# Patient Record
Sex: FEMALE | Race: WHITE | NOT HISPANIC OR LATINO | Employment: OTHER
[De-identification: names, ages, dates, MRNs, and addresses within clinical notes are randomized per-mention and may not be internally consistent; named-entity substitution may affect disease eponyms.]

---

## 2017-05-17 ENCOUNTER — HOSPITAL (OUTPATIENT)
Dept: OTHER | Age: 47
End: 2017-05-17
Attending: EMERGENCY MEDICINE

## 2017-05-22 PROBLEM — M25.462 EFFUSION OF LEFT KNEE: Status: ACTIVE | Noted: 2017-05-22

## 2017-06-07 PROBLEM — S83.512D SPRAIN OF ANTERIOR CRUCIATE LIGAMENT OF LEFT KNEE, SUBSEQUENT ENCOUNTER: Status: ACTIVE | Noted: 2017-06-07

## 2017-06-12 PROBLEM — Z98.890 S/P ACL RECONSTRUCTION: Status: ACTIVE | Noted: 2017-06-12

## 2017-06-12 PROBLEM — M25.562 ACUTE PAIN OF LEFT KNEE: Status: ACTIVE | Noted: 2017-06-12

## 2017-09-29 ENCOUNTER — OFFICE VISIT (OUTPATIENT)
Dept: INTERNAL MEDICINE CLINIC | Facility: CLINIC | Age: 47
End: 2017-09-29

## 2017-09-29 VITALS
HEIGHT: 67 IN | BODY MASS INDEX: 23.7 KG/M2 | WEIGHT: 151 LBS | TEMPERATURE: 98 F | SYSTOLIC BLOOD PRESSURE: 120 MMHG | HEART RATE: 77 BPM | OXYGEN SATURATION: 98 % | DIASTOLIC BLOOD PRESSURE: 76 MMHG

## 2017-09-29 DIAGNOSIS — J06.9 URI, ACUTE: Primary | ICD-10-CM

## 2017-09-29 PROCEDURE — 99212 OFFICE O/P EST SF 10 MIN: CPT | Performed by: INTERNAL MEDICINE

## 2017-09-29 PROCEDURE — 99213 OFFICE O/P EST LOW 20 MIN: CPT | Performed by: INTERNAL MEDICINE

## 2017-09-29 RX ORDER — AMOXICILLIN AND CLAVULANATE POTASSIUM 875; 125 MG/1; MG/1
1 TABLET, FILM COATED ORAL 2 TIMES DAILY
Qty: 20 TABLET | Refills: 0 | Status: SHIPPED | OUTPATIENT
Start: 2017-09-29 | End: 2017-10-09

## 2017-09-29 NOTE — PROGRESS NOTES
Jhonny Keen is a 40year old female. HPI:   Patient presents with:  Checkup: Cough, colds and sore throat last week.  Went away, sore throat back since yesterday; ear ache      39 y/o F with 1 week h/o +cough; +sputum; +pharyngitis; +sinus and nasal con for abdominal pain, constipation, decreased appetite, diarrhea and vomiting; no melena or hematochezia  All other review of systems are negative.         PHYSICAL EXAM:   Blood pressure 120/76, pulse 77, temperature 98.4 °F (36.9 °C), temperature source [facility]

## 2019-07-18 ENCOUNTER — IMAGING SERVICES (OUTPATIENT)
Dept: OTHER | Age: 49
End: 2019-07-18
Attending: OBSTETRICS & GYNECOLOGY

## 2019-07-30 ENCOUNTER — IMAGING SERVICES (OUTPATIENT)
Dept: OTHER | Age: 49
End: 2019-07-30
Attending: OBSTETRICS & GYNECOLOGY

## 2020-08-11 ENCOUNTER — IMAGING SERVICES (OUTPATIENT)
Dept: OTHER | Age: 50
End: 2020-08-11
Attending: OBSTETRICS & GYNECOLOGY

## 2020-10-24 ENCOUNTER — HOSPITAL ENCOUNTER (OUTPATIENT)
Age: 50
Discharge: HOME | End: 2020-10-24
Attending: EMERGENCY MEDICINE
Payer: COMMERCIAL

## 2020-10-24 VITALS
TEMPERATURE: 98 F | SYSTOLIC BLOOD PRESSURE: 148 MMHG | HEART RATE: 79 BPM | RESPIRATION RATE: 18 BRPM | DIASTOLIC BLOOD PRESSURE: 86 MMHG | OXYGEN SATURATION: 98 %

## 2020-10-24 DIAGNOSIS — Z20.822 ENCOUNTER FOR SCREENING LABORATORY TESTING FOR COVID-19 VIRUS IN ASYMPTOMATIC PATIENT: Primary | ICD-10-CM

## 2020-10-24 PROCEDURE — 99203 OFFICE O/P NEW LOW 30 MIN: CPT

## 2020-10-24 NOTE — ED PROVIDER NOTES
Patient Seen in: [facility] [place]      History   Patient presents with:  Testing    Stated Complaint: covid test    HPI    The patient is a 45-year-old female with no significant past medical history presents now for Covid testing.   The patient sta distress  Head: Normocephalic, no swelling or tenderness  Eyes: Nonicteric sclera, no conjunctival injection  ENT: TMs are clear and flat bilaterally.   There is no posterior pharyngeal erythema  Chest: Clear to auscultation, no tenderness  Cardiovascular:

## 2020-11-01 ENCOUNTER — HOSPITAL ENCOUNTER (OUTPATIENT)
Age: 50
Discharge: HOME | End: 2020-11-01
Payer: COMMERCIAL

## 2020-11-01 VITALS
DIASTOLIC BLOOD PRESSURE: 78 MMHG | TEMPERATURE: 98 F | WEIGHT: 156 LBS | SYSTOLIC BLOOD PRESSURE: 135 MMHG | BODY MASS INDEX: 24.48 KG/M2 | HEIGHT: 67 IN | RESPIRATION RATE: 20 BRPM | HEART RATE: 77 BPM | OXYGEN SATURATION: 98 %

## 2020-11-01 DIAGNOSIS — R50.81 FEVER IN OTHER DISEASES: ICD-10-CM

## 2020-11-01 DIAGNOSIS — Z20.822 EXPOSURE TO COVID-19 VIRUS: Primary | ICD-10-CM

## 2020-11-01 DIAGNOSIS — R09.81 NASAL CONGESTION: ICD-10-CM

## 2020-11-01 PROCEDURE — 99213 OFFICE O/P EST LOW 20 MIN: CPT

## 2020-11-01 NOTE — ED PROVIDER NOTES
Patient Seen in: [facility] [place]      History   Patient presents with:  Sinus Problem    Stated Complaint: Covid 19 test    HPI    Patient presents to the immediate care requesting COVID-19 testing.   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 HENT: Positive for rhinorrhea. Respiratory: Negative. Cardiovascular: Negative. Gastrointestinal: Negative. Skin: Negative. Neurological: Negative.         Pos time.      Deep Tendon Reflexes: Reflexes are normal and symmetric. Psychiatric:         Behavior: Behavior normal.         Thought Content:  Thought content normal.         Judgment: Judgment normal.               ED Course     Labs Reviewed   SARS-COV-2

## 2021-02-25 ENCOUNTER — OFFICE VISIT (OUTPATIENT)
Dept: INTERNAL MEDICINE CLINIC | Facility: CLINIC | Age: 51
End: 2021-02-25
Payer: COMMERCIAL

## 2021-02-25 VITALS
BODY MASS INDEX: 25.11 KG/M2 | HEIGHT: 67 IN | OXYGEN SATURATION: 99 % | SYSTOLIC BLOOD PRESSURE: 136 MMHG | TEMPERATURE: 99 F | HEART RATE: 99 BPM | WEIGHT: 160 LBS | DIASTOLIC BLOOD PRESSURE: 76 MMHG

## 2021-02-25 DIAGNOSIS — N30.00 ACUTE CYSTITIS WITHOUT HEMATURIA: Primary | ICD-10-CM

## 2021-02-25 DIAGNOSIS — G47.00 INSOMNIA, UNSPECIFIED TYPE: ICD-10-CM

## 2021-02-25 DIAGNOSIS — Z00.00 HEALTHCARE MAINTENANCE: ICD-10-CM

## 2021-02-25 LAB
GLUCOSE (URINE DIPSTICK): NEGATIVE MG/DL
KETONES (URINE DIPSTICK): NEGATIVE MG/DL
MULTISTIX LOT#: NORMAL NUMERIC
NITRITE, URINE: NEGATIVE
PH, URINE: 5 (ref 4.5–8)
SPECIFIC GRAVITY: 1.02 (ref 1–1.03)
URINE-COLOR: YELLOW
UROBILINOGEN,SEMI-QN: 0.2 MG/DL (ref 0–1.9)

## 2021-02-25 PROCEDURE — 3075F SYST BP GE 130 - 139MM HG: CPT | Performed by: INTERNAL MEDICINE

## 2021-02-25 PROCEDURE — 99214 OFFICE O/P EST MOD 30 MIN: CPT | Performed by: INTERNAL MEDICINE

## 2021-02-25 PROCEDURE — 3078F DIAST BP <80 MM HG: CPT | Performed by: INTERNAL MEDICINE

## 2021-02-25 PROCEDURE — 81002 URINALYSIS NONAUTO W/O SCOPE: CPT | Performed by: INTERNAL MEDICINE

## 2021-02-25 PROCEDURE — 3008F BODY MASS INDEX DOCD: CPT | Performed by: INTERNAL MEDICINE

## 2021-02-25 RX ORDER — TRAZODONE HYDROCHLORIDE 50 MG/1
50 TABLET ORAL NIGHTLY
Qty: 30 TABLET | Refills: 5 | Status: SHIPPED | OUTPATIENT
Start: 2021-02-25

## 2021-02-25 RX ORDER — SULFAMETHOXAZOLE AND TRIMETHOPRIM 800; 160 MG/1; MG/1
1 TABLET ORAL 2 TIMES DAILY
Qty: 14 TABLET | Refills: 0 | Status: SHIPPED | OUTPATIENT
Start: 2021-02-25

## 2021-02-25 NOTE — PROGRESS NOTES
Tian Tian is a 53year old female.     HPI:   Patient presents with:  UTI: Patient here due to frequent urination x 1 week  Sleep Problem: pt also c/o not being able to sleep      52 y/o F with 1 week h/o +urine frequency; no dysuria; no abdominal pain; (VESTURA) 3-0.02 MG Oral Tab Take 1 tablet by mouth daily.          Allergies:  No Known Allergies              ROS:   Constitutional: no weight loss; no fatigue  Cardiovascular:  Negative for chest pain; negative palpitations  Respiratory:  Negative for co tablet (50 mg total) by mouth nightly.        Imaging & Referrals:  None     2/25/2021  Chaim Cancino MD

## 2021-03-01 ENCOUNTER — TELEPHONE (OUTPATIENT)
Dept: INTERNAL MEDICINE CLINIC | Facility: CLINIC | Age: 51
End: 2021-03-01

## 2021-03-01 RX ORDER — CIPROFLOXACIN 500 MG/1
500 TABLET, FILM COATED ORAL 2 TIMES DAILY
Qty: 14 TABLET | Refills: 0 | Status: SHIPPED | OUTPATIENT
Start: 2021-03-01

## 2021-03-02 NOTE — TELEPHONE ENCOUNTER
Imp- UTI; E Coli > 100K resistant to sulfa    Rec- stop Bactrim    Start Cipro 500 mg po BID #14;    Pt called

## 2021-03-18 ENCOUNTER — IMMUNIZATION (OUTPATIENT)
Dept: LAB | Facility: HOSPITAL | Age: 51
End: 2021-03-18
Attending: HOSPITALIST
Payer: COMMERCIAL

## 2021-03-18 DIAGNOSIS — Z23 NEED FOR VACCINATION: Primary | ICD-10-CM

## 2021-03-18 PROCEDURE — 0011A SARSCOV2 VAC 100MCG/0.5ML IM: CPT

## 2021-04-15 ENCOUNTER — IMMUNIZATION (OUTPATIENT)
Dept: LAB | Facility: HOSPITAL | Age: 51
End: 2021-04-15
Attending: EMERGENCY MEDICINE
Payer: COMMERCIAL

## 2021-04-15 DIAGNOSIS — Z23 NEED FOR VACCINATION: Primary | ICD-10-CM

## 2021-04-15 PROCEDURE — 0012A SARSCOV2 VAC 100MCG/0.5ML IM: CPT

## 2021-07-29 ENCOUNTER — HOSPITAL ENCOUNTER (OUTPATIENT)
Dept: ULTRASOUND IMAGING | Age: 51
Discharge: HOME | End: 2021-07-29
Attending: OBSTETRICS & GYNECOLOGY

## 2021-07-29 ENCOUNTER — HOSPITAL ENCOUNTER (OUTPATIENT)
Dept: MAMMOGRAPHY | Age: 51
Discharge: HOME | End: 2021-07-29
Attending: OBSTETRICS & GYNECOLOGY

## 2021-07-29 DIAGNOSIS — R92.2 DENSE BREAST: ICD-10-CM

## 2021-07-29 DIAGNOSIS — R92.30 DENSE BREAST: ICD-10-CM

## 2021-07-29 DIAGNOSIS — Z12.31 ENCOUNTER FOR SCREENING MAMMOGRAM FOR MALIGNANT NEOPLASM OF BREAST: ICD-10-CM

## 2021-07-29 PROCEDURE — 77067 SCR MAMMO BI INCL CAD: CPT

## 2021-07-29 PROCEDURE — 76641 ULTRASOUND BREAST COMPLETE: CPT

## 2022-06-20 ENCOUNTER — PATIENT MESSAGE (OUTPATIENT)
Dept: INTERNAL MEDICINE CLINIC | Facility: CLINIC | Age: 52
End: 2022-06-20

## 2022-06-20 NOTE — TELEPHONE ENCOUNTER
From: Mika Perez  Sent: 6/20/2022 2:55 PM CDT  To: [facility] Clinical Staff  Subject: Annual Physical    Ok thank you !  Going on vacation til June 28   Yoli

## 2022-07-29 ENCOUNTER — TELEPHONE (OUTPATIENT)
Dept: INTERNAL MEDICINE CLINIC | Facility: CLINIC | Age: 52
End: 2022-07-29

## 2022-07-29 DIAGNOSIS — E55.9 VITAMIN D DEFICIENCY: ICD-10-CM

## 2022-07-29 DIAGNOSIS — Z00.00 ANNUAL PHYSICAL EXAM: Primary | ICD-10-CM

## 2022-07-29 NOTE — TELEPHONE ENCOUNTER
Patient called to schedule an annual physical with Dr Caitlin Treadwell. Patient scheduled on 8/11/2022 at 1515. Patient is requesting blood work to be added to the system to complete ahead of time (patient is going to go to the [address] [place] [address] location). Please send patient a [employer] message when orders are placed.

## 2022-08-11 ENCOUNTER — LAB ENCOUNTER (OUTPATIENT)
Dept: LAB | Age: 52
End: 2022-08-11
Attending: INTERNAL MEDICINE
Payer: COMMERCIAL

## 2022-08-11 ENCOUNTER — OFFICE VISIT (OUTPATIENT)
Dept: INTERNAL MEDICINE CLINIC | Facility: CLINIC | Age: 52
End: 2022-08-11
Payer: COMMERCIAL

## 2022-08-11 VITALS
HEIGHT: 67 IN | HEART RATE: 79 BPM | WEIGHT: 165.13 LBS | BODY MASS INDEX: 25.92 KG/M2 | RESPIRATION RATE: 15 BRPM | SYSTOLIC BLOOD PRESSURE: 126 MMHG | DIASTOLIC BLOOD PRESSURE: 78 MMHG

## 2022-08-11 DIAGNOSIS — M17.11 PRIMARY OSTEOARTHRITIS OF RIGHT KNEE: ICD-10-CM

## 2022-08-11 DIAGNOSIS — E55.9 VITAMIN D DEFICIENCY: ICD-10-CM

## 2022-08-11 DIAGNOSIS — Z00.00 ANNUAL PHYSICAL EXAM: Primary | ICD-10-CM

## 2022-08-11 DIAGNOSIS — J30.9 ALLERGIC RHINITIS, UNSPECIFIED SEASONALITY, UNSPECIFIED TRIGGER: ICD-10-CM

## 2022-08-11 DIAGNOSIS — Z00.00 ANNUAL PHYSICAL EXAM: ICD-10-CM

## 2022-08-11 LAB
ALBUMIN SERPL-MCNC: 3.9 G/DL (ref 3.4–5)
ALBUMIN/GLOB SERPL: 1 {RATIO} (ref 1–2)
ALP LIVER SERPL-CCNC: 89 U/L
ALT SERPL-CCNC: 32 U/L
ANION GAP SERPL CALC-SCNC: 6 MMOL/L (ref 0–18)
AST SERPL-CCNC: 18 U/L (ref 15–37)
BASOPHILS # BLD AUTO: 0.07 X10(3) UL (ref 0–0.2)
BASOPHILS NFR BLD AUTO: 0.9 %
BILIRUB SERPL-MCNC: 0.8 MG/DL (ref 0.1–2)
BUN BLD-MCNC: 16 MG/DL (ref 7–18)
BUN/CREAT SERPL: 16.7 (ref 10–20)
CALCIUM BLD-MCNC: 9.4 MG/DL (ref 8.5–10.1)
CHLORIDE SERPL-SCNC: 107 MMOL/L (ref 98–112)
CHOLEST SERPL-MCNC: 136 MG/DL (ref ?–200)
CO2 SERPL-SCNC: 29 MMOL/L (ref 21–32)
CREAT BLD-MCNC: 0.96 MG/DL
DEPRECATED RDW RBC AUTO: 41.1 FL (ref 35.1–46.3)
EOSINOPHIL # BLD AUTO: 0.21 X10(3) UL (ref 0–0.7)
EOSINOPHIL NFR BLD AUTO: 2.6 %
ERYTHROCYTE [DISTWIDTH] IN BLOOD BY AUTOMATED COUNT: 12.5 % (ref 11–15)
FASTING PATIENT LIPID ANSWER: NO
FASTING STATUS PATIENT QL REPORTED: NO
GFR SERPLBLD BASED ON 1.73 SQ M-ARVRAT: 72 ML/MIN/1.73M2 (ref 60–?)
GLOBULIN PLAS-MCNC: 3.8 G/DL (ref 2.8–4.4)
GLUCOSE BLD-MCNC: 78 MG/DL (ref 70–99)
HCT VFR BLD AUTO: 42.6 %
HDLC SERPL-MCNC: 69 MG/DL (ref 40–59)
HGB BLD-MCNC: 13.4 G/DL
IMM GRANULOCYTES # BLD AUTO: 0.02 X10(3) UL (ref 0–1)
IMM GRANULOCYTES NFR BLD: 0.2 %
LDLC SERPL CALC-MCNC: 56 MG/DL (ref ?–100)
LYMPHOCYTES # BLD AUTO: 2.66 X10(3) UL (ref 1–4)
LYMPHOCYTES NFR BLD AUTO: 32.4 %
MCH RBC QN AUTO: 28.2 PG (ref 26–34)
MCHC RBC AUTO-ENTMCNC: 31.5 G/DL (ref 31–37)
MCV RBC AUTO: 89.5 FL
MONOCYTES # BLD AUTO: 0.91 X10(3) UL (ref 0.1–1)
MONOCYTES NFR BLD AUTO: 11.1 %
NEUTROPHILS # BLD AUTO: 4.34 X10 (3) UL (ref 1.5–7.7)
NEUTROPHILS # BLD AUTO: 4.34 X10(3) UL (ref 1.5–7.7)
NEUTROPHILS NFR BLD AUTO: 52.8 %
NONHDLC SERPL-MCNC: 67 MG/DL (ref ?–130)
OSMOLALITY SERPL CALC.SUM OF ELEC: 294 MOSM/KG (ref 275–295)
PLATELET # BLD AUTO: 357 10(3)UL (ref 150–450)
POTASSIUM SERPL-SCNC: 4.3 MMOL/L (ref 3.5–5.1)
PROT SERPL-MCNC: 7.7 G/DL (ref 6.4–8.2)
RBC # BLD AUTO: 4.76 X10(6)UL
SODIUM SERPL-SCNC: 142 MMOL/L (ref 136–145)
TRIGL SERPL-MCNC: 51 MG/DL (ref 30–149)
TSI SER-ACNC: 1.23 MIU/ML (ref 0.36–3.74)
VIT D+METAB SERPL-MCNC: 47.1 NG/ML (ref 30–100)
VLDLC SERPL CALC-MCNC: 7 MG/DL (ref 0–30)
WBC # BLD AUTO: 8.2 X10(3) UL (ref 4–11)

## 2022-08-11 PROCEDURE — 85025 COMPLETE CBC W/AUTO DIFF WBC: CPT

## 2022-08-11 PROCEDURE — 84443 ASSAY THYROID STIM HORMONE: CPT

## 2022-08-11 PROCEDURE — 80053 COMPREHEN METABOLIC PANEL: CPT

## 2022-08-11 PROCEDURE — 82306 VITAMIN D 25 HYDROXY: CPT

## 2022-08-11 PROCEDURE — 36415 COLL VENOUS BLD VENIPUNCTURE: CPT

## 2022-08-11 PROCEDURE — 99396 PREV VISIT EST AGE 40-64: CPT | Performed by: INTERNAL MEDICINE

## 2022-08-11 PROCEDURE — 3078F DIAST BP <80 MM HG: CPT | Performed by: INTERNAL MEDICINE

## 2022-08-11 PROCEDURE — 3008F BODY MASS INDEX DOCD: CPT | Performed by: INTERNAL MEDICINE

## 2022-08-11 PROCEDURE — 3074F SYST BP LT 130 MM HG: CPT | Performed by: INTERNAL MEDICINE

## 2022-08-11 PROCEDURE — 80061 LIPID PANEL: CPT

## 2022-09-23 ENCOUNTER — HOSPITAL ENCOUNTER (OUTPATIENT)
Age: 52
Discharge: HOME | End: 2022-09-23
Attending: EMERGENCY MEDICINE

## 2022-09-23 ENCOUNTER — APPOINTMENT (OUTPATIENT)
Dept: GENERAL RADIOLOGY | Age: 52
End: 2022-09-23
Attending: EMERGENCY MEDICINE

## 2022-09-23 VITALS
SYSTOLIC BLOOD PRESSURE: 125 MMHG | TEMPERATURE: 98 F | RESPIRATION RATE: 18 BRPM | HEART RATE: 66 BPM | OXYGEN SATURATION: 99 % | DIASTOLIC BLOOD PRESSURE: 75 MMHG

## 2022-09-23 DIAGNOSIS — S60.222A CONTUSION OF LEFT HAND, INITIAL ENCOUNTER: Primary | ICD-10-CM

## 2022-09-23 PROCEDURE — 73130 X-RAY EXAM OF HAND: CPT | Performed by: EMERGENCY MEDICINE

## 2022-09-23 PROCEDURE — 99213 OFFICE O/P EST LOW 20 MIN: CPT
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
## 2022-10-18 ENCOUNTER — HOSPITAL ENCOUNTER (OUTPATIENT)
Dept: MAMMOGRAPHY | Age: 52
Discharge: HOME | End: 2022-10-18
Attending: OBSTETRICS & GYNECOLOGY

## 2022-10-18 DIAGNOSIS — Z12.31 VISIT FOR SCREENING MAMMOGRAM: ICD-10-CM

## 2022-10-18 PROCEDURE — 77063 BREAST TOMOSYNTHESIS BI: CPT

## 2022-12-27 ENCOUNTER — E-VISIT (OUTPATIENT)
Dept: TELEHEALTH | Age: 52
End: 2022-12-27
Payer: COMMERCIAL

## 2022-12-27 DIAGNOSIS — R39.9 UTI SYMPTOMS: Primary | ICD-10-CM

## 2022-12-27 PROCEDURE — 99421 OL DIG E/M SVC 5-10 MIN: CPT | Performed by: NURSE PRACTITIONER

## 2022-12-28 RX ORDER — CETIRIZINE HYDROCHLORIDE 10 MG/1
10 TABLET ORAL DAILY
Refills: 0 | OUTPATIENT
Start: 2022-12-28

## 2022-12-28 RX ORDER — NITROFURANTOIN 25; 75 MG/1; MG/1
100 CAPSULE ORAL 2 TIMES DAILY
Qty: 10 CAPSULE | Refills: 0 | Status: SHIPPED | OUTPATIENT
Start: 2022-12-28 | End: 2023-01-02

## 2024-04-01 ENCOUNTER — OFFICE VISIT (OUTPATIENT)
Dept: INTERNAL MEDICINE CLINIC | Facility: CLINIC | Age: 54
End: 2024-04-01

## 2024-04-01 ENCOUNTER — LAB ENCOUNTER (OUTPATIENT)
Dept: LAB | Age: 54
End: 2024-04-01
Attending: INTERNAL MEDICINE
Payer: COMMERCIAL

## 2024-04-01 VITALS — BODY MASS INDEX: 25.9 KG/M2 | WEIGHT: 165 LBS | HEIGHT: 67 IN

## 2024-04-01 DIAGNOSIS — Z00.00 HEALTHCARE MAINTENANCE: ICD-10-CM

## 2024-04-01 DIAGNOSIS — E66.3 OVERWEIGHT (BMI 25.0-29.9): ICD-10-CM

## 2024-04-01 DIAGNOSIS — R19.7 DIARRHEA OF PRESUMED INFECTIOUS ORIGIN: Primary | ICD-10-CM

## 2024-04-01 LAB
ALBUMIN SERPL-MCNC: 4.5 G/DL (ref 3.2–4.8)
ALBUMIN/GLOB SERPL: 1.5 {RATIO} (ref 1–2)
ALP LIVER SERPL-CCNC: 100 U/L
ALT SERPL-CCNC: 21 U/L
ANION GAP SERPL CALC-SCNC: 3 MMOL/L (ref 0–18)
AST SERPL-CCNC: 19 U/L (ref ?–34)
BASOPHILS # BLD AUTO: 0.1 X10(3) UL (ref 0–0.2)
BASOPHILS NFR BLD AUTO: 1.1 %
BILIRUB SERPL-MCNC: 0.4 MG/DL (ref 0.3–1.2)
BUN BLD-MCNC: 13 MG/DL (ref 9–23)
BUN/CREAT SERPL: 16.9 (ref 10–20)
CALCIUM BLD-MCNC: 9.5 MG/DL (ref 8.7–10.4)
CHLORIDE SERPL-SCNC: 111 MMOL/L (ref 98–112)
CHOLEST SERPL-MCNC: 115 MG/DL (ref ?–200)
CO2 SERPL-SCNC: 30 MMOL/L (ref 21–32)
CREAT BLD-MCNC: 0.77 MG/DL
DEPRECATED RDW RBC AUTO: 37.3 FL (ref 35.1–46.3)
EGFRCR SERPLBLD CKD-EPI 2021: 93 ML/MIN/1.73M2 (ref 60–?)
EOSINOPHIL # BLD AUTO: 0.18 X10(3) UL (ref 0–0.7)
EOSINOPHIL NFR BLD AUTO: 1.9 %
ERYTHROCYTE [DISTWIDTH] IN BLOOD BY AUTOMATED COUNT: 12.3 % (ref 11–15)
FASTING PATIENT LIPID ANSWER: YES
FASTING STATUS PATIENT QL REPORTED: YES
GLOBULIN PLAS-MCNC: 3.1 G/DL (ref 2.8–4.4)
GLUCOSE BLD-MCNC: 82 MG/DL (ref 70–99)
HCT VFR BLD AUTO: 41.7 %
HDLC SERPL-MCNC: 40 MG/DL (ref 40–59)
HGB BLD-MCNC: 14.5 G/DL
IMM GRANULOCYTES # BLD AUTO: 0.02 X10(3) UL (ref 0–1)
IMM GRANULOCYTES NFR BLD: 0.2 %
LDLC SERPL CALC-MCNC: 57 MG/DL (ref ?–100)
LYMPHOCYTES # BLD AUTO: 2.62 X10(3) UL (ref 1–4)
LYMPHOCYTES NFR BLD AUTO: 27.6 %
MCH RBC QN AUTO: 29.2 PG (ref 26–34)
MCHC RBC AUTO-ENTMCNC: 34.8 G/DL (ref 31–37)
MCV RBC AUTO: 83.9 FL
MONOCYTES # BLD AUTO: 0.74 X10(3) UL (ref 0.1–1)
MONOCYTES NFR BLD AUTO: 7.8 %
NEUTROPHILS # BLD AUTO: 5.84 X10 (3) UL (ref 1.5–7.7)
NEUTROPHILS # BLD AUTO: 5.84 X10(3) UL (ref 1.5–7.7)
NEUTROPHILS NFR BLD AUTO: 61.4 %
NONHDLC SERPL-MCNC: 75 MG/DL (ref ?–130)
OSMOLALITY SERPL CALC.SUM OF ELEC: 297 MOSM/KG (ref 275–295)
PLATELET # BLD AUTO: 352 10(3)UL (ref 150–450)
POTASSIUM SERPL-SCNC: 3.9 MMOL/L (ref 3.5–5.1)
PROT SERPL-MCNC: 7.6 G/DL (ref 5.7–8.2)
RBC # BLD AUTO: 4.97 X10(6)UL
SODIUM SERPL-SCNC: 144 MMOL/L (ref 136–145)
TRIGL SERPL-MCNC: 97 MG/DL (ref 30–149)
TSI SER-ACNC: 1 MIU/ML (ref 0.55–4.78)
VLDLC SERPL CALC-MCNC: 14 MG/DL (ref 0–30)
WBC # BLD AUTO: 9.5 X10(3) UL (ref 4–11)

## 2024-04-01 PROCEDURE — 3008F BODY MASS INDEX DOCD: CPT | Performed by: INTERNAL MEDICINE

## 2024-04-01 PROCEDURE — 85025 COMPLETE CBC W/AUTO DIFF WBC: CPT

## 2024-04-01 PROCEDURE — 36415 COLL VENOUS BLD VENIPUNCTURE: CPT

## 2024-04-01 PROCEDURE — 99214 OFFICE O/P EST MOD 30 MIN: CPT | Performed by: INTERNAL MEDICINE

## 2024-04-01 PROCEDURE — 84443 ASSAY THYROID STIM HORMONE: CPT

## 2024-04-01 PROCEDURE — 80061 LIPID PANEL: CPT

## 2024-04-01 PROCEDURE — 80053 COMPREHEN METABOLIC PANEL: CPT

## 2024-04-01 RX ORDER — CIPROFLOXACIN 500 MG/1
500 TABLET, FILM COATED ORAL 2 TIMES DAILY
Qty: 14 TABLET | Refills: 0 | Status: SHIPPED | OUTPATIENT
Start: 2024-04-01

## 2024-04-01 RX ORDER — PHENTERMINE HYDROCHLORIDE 15 MG/1
15 CAPSULE ORAL EVERY MORNING
Qty: 30 CAPSULE | Refills: 3 | Status: SHIPPED | OUTPATIENT
Start: 2024-04-01

## 2024-04-01 NOTE — PROGRESS NOTES
Yoli Callejas is a 53 year old female.    HPI:     Chief Complaint   Patient presents with    Checkup     C/O Abnormal Bowel Habits; Started off with GI upset and vomiting Fri 3/29, the 3-4 bouts of Diarrhea daily. Seeing some improvements since starting probiotic however leaving for trip to [place]       54 y/o F with 10 d h/o diarrhea; had eaten chicken prior to onset of sxs; pt initially though she had food poisoning; has +nausea with emesis x 24 hours; had stool frequency 4-5 times per days x 5d; took probiotic that has helped firm up stool; no F/C; no melena; leaving [place] x 3d; nares COVID test neg 7 d ago; wishes to pursue medical Rx for weight loss        HISTORY:  Past Medical History:   Diagnosis Date    Allergic rhinitis     Depression     Environmental allergies     Pneumothorax 2005    S/P ACL repair     on left knee in June 2017; by Ortho Dr Colon    S/P ACL repair     of right knee in 2010      Past Surgical History:   Procedure Laterality Date    APPENDECTOMY      KNEE SURGERY Left 06/02/2017    ACL, Dr Colon    OTHER SURGICAL HISTORY      Right ACL repair    OTHER SURGICAL HISTORY  06/2017    Left ACL repair      Family History   Problem Relation Age of Onset    Hypertension Father     Heart Disorder Father 50    Diabetes Father         type 2    Lung Disorder Father         Emphysema    Heart Disease Father     Cancer Mother         pancreatic    Breast Cancer Maternal Grandmother     Cancer Brother         testicular      Social History:   Social History     Socioeconomic History    Marital status:    Tobacco Use    Smoking status: Never    Smokeless tobacco: Never   Vaping Use    Vaping Use: Never used   Substance and Sexual Activity    Alcohol use: Yes     Comment: 2 drinks a month    Drug use: No   Other Topics Concern    Caffeine Concern Yes     Comment: Coffee 3 cups daily        Medications (Active prior to today's visit):  Current Outpatient Medications   Medication Sig Dispense  Refill    ciprofloxacin 500 MG Oral Tab Take 1 tablet (500 mg total) by mouth 2 (two) times daily. 14 tablet 0    Phentermine HCl 15 MG Oral Cap Take 1 capsule (15 mg total) by mouth every morning. 30 capsule 3    cetirizine (ZYRTEC) 10 MG Oral Tab Take 1 tablet (10 mg total) by mouth daily. For allergies         Allergies:  No Known Allergies              ROS:   Constitutional: no weight loss; no fatigue  Cardiovascular:  Negative for chest pain; negative palpitations  Respiratory:  Negative for cough, dyspnea and wheezing  Gastrointestinal:  Negative for abdominal pain, constipation, decreased appetite, diarrhea and vomiting; no melena or hematochezia  All other review of systems are negative.        PHYSICAL EXAM:   Height 5' 7\" (1.702 m), weight 165 lb (74.8 kg).  Constitutional: alert and oriented x3 in no acute distress  HEENT- EOMI, PERRL  Nose/Mouth/Throat: pharynx without erythema; no oral lesions  Neck/Thyroid: neck supple; no thyromegaly  Cardiovascular: RRR, S1, S2, no S3 or murmur  Respiratory: lungs without crackles or wheezes  Abdomen: normoactive bowel sounds, soft, non-tender and non-distended  Extremities: no clubbing, cyanosis or edema           ASSESSMENT/PLAN:   Diarrhea  Sxs x 10d; presumed infectious; leaving for [place] in 3 d  -empiric Cipro 500 mg po BID #14    Overweight  Body mass index is 25.84 kg/m².  -trial phentermine 15 mg po every day #30, 3 RF    Health Maintenance  Sees gynecologist Dr Rodríguez; mammo per Dr Rodríguez; had negative Cologuard in June 2022; next due in 3 years or June 2025; check CBC, CMP, TSH, Lipids, Vitamin D     Osteoarhritis of right knee  XR in Feb 2022 shows evidence of previous ACL   reconstruction of the right knee.  There is moderate narrowing of the   medial, lateral, patellofemoral compartment.  -seen by orthopedics, Dr Romero     Allergic rhinitis  On Zyrtec 10 mg po qD          Spent 30 minutes obtaining history, evaluating patient, discussing treatment  options, diet, exercise, review of available labs and radiology reports, and completing documentation.             Orders This Visit:  Orders Placed This Encounter   Procedures    CBC With Differential With Platelet    Comp Metabolic Panel (14)    Lipid Panel    TSH W Reflex To Free T4       Meds This Visit:  Requested Prescriptions     Signed Prescriptions Disp Refills    ciprofloxacin 500 MG Oral Tab 14 tablet 0     Sig: Take 1 tablet (500 mg total) by mouth 2 (two) times daily.    Phentermine HCl 15 MG Oral Cap 30 capsule 3     Sig: Take 1 capsule (15 mg total) by mouth every morning.       Imaging & Referrals:  None     4/1/2024  Fito Hi MD

## 2024-07-23 ENCOUNTER — TELEPHONE (OUTPATIENT)
Dept: INTERNAL MEDICINE CLINIC | Facility: CLINIC | Age: 54
End: 2024-07-23

## 2024-07-23 DIAGNOSIS — E66.3 OVERWEIGHT (BMI 25.0-29.9): ICD-10-CM

## 2024-07-23 NOTE — TELEPHONE ENCOUNTER
Patient requests refill for Phentermine    Patient has lost 14 lbs since taking the medication  She feels better & hasn't experienced any side effects    Ph[facility]acy - [facility] [place]

## 2024-07-24 RX ORDER — PHENTERMINE HYDROCHLORIDE 15 MG/1
15 CAPSULE ORAL EVERY MORNING
Qty: 30 CAPSULE | Refills: 3 | Status: SHIPPED | OUTPATIENT
Start: 2024-07-24

## 2024-07-24 NOTE — TELEPHONE ENCOUNTER
To MD:  The above refill request is for a controlled substance.  Please review pended medication order.   Print and sign for staff to fax to pharmacy or prescribe electronically.    Last office visit:4/1/24  Last time refill sent and quantity/refills:4/1/24 # 30    To

## 2025-03-20 ENCOUNTER — TELEPHONE (OUTPATIENT)
Dept: INTERNAL MEDICINE CLINIC | Facility: CLINIC | Age: 55
End: 2025-03-20

## 2025-03-20 DIAGNOSIS — E66.3 OVERWEIGHT (BMI 25.0-29.9): ICD-10-CM

## 2025-03-20 RX ORDER — PHENTERMINE HYDROCHLORIDE 15 MG/1
15 CAPSULE ORAL EVERY MORNING
Qty: 30 CAPSULE | Refills: 1 | Status: SHIPPED | OUTPATIENT
Start: 2025-03-20

## 2025-03-20 NOTE — TELEPHONE ENCOUNTER
Pt not taking medication regularly? Last filled 11/22/24    To MD:  The above refill request is for a controlled substance.  Please review pended medication order.   Print and sign for staff to fax to pharmacy or prescribe electronically.    Last office visit: 4/1/24  Last time refill sent and quantity/refills: 7/24/24 #30 and 3 refills  [place]22/24 #30

## 2025-03-20 NOTE — TELEPHONE ENCOUNTER
ERx sent for phentermine 15 mg po every day #30, 1 RF    Last office visit: 4/1/24     She is due for annual PEx; please call pt

## 2025-05-21 ENCOUNTER — MED REC SCAN ONLY (OUTPATIENT)
Dept: INTERNAL MEDICINE CLINIC | Facility: CLINIC | Age: 55
End: 2025-05-21

## 2025-05-21 ENCOUNTER — OFFICE VISIT (OUTPATIENT)
Dept: INTERNAL MEDICINE CLINIC | Facility: CLINIC | Age: 55
End: 2025-05-21
Payer: COMMERCIAL

## 2025-05-21 ENCOUNTER — TELEPHONE (OUTPATIENT)
Dept: INTERNAL MEDICINE CLINIC | Facility: CLINIC | Age: 55
End: 2025-05-21

## 2025-05-21 ENCOUNTER — LAB ENCOUNTER (OUTPATIENT)
Dept: LAB | Age: 55
End: 2025-05-21
Attending: INTERNAL MEDICINE
Payer: COMMERCIAL

## 2025-05-21 VITALS
HEART RATE: 72 BPM | TEMPERATURE: 98 F | OXYGEN SATURATION: 98 % | DIASTOLIC BLOOD PRESSURE: 89 MMHG | SYSTOLIC BLOOD PRESSURE: 157 MMHG | HEIGHT: 67 IN | BODY MASS INDEX: 22.6 KG/M2 | WEIGHT: 144 LBS | RESPIRATION RATE: 16 BRPM

## 2025-05-21 DIAGNOSIS — Z00.00 PHYSICAL EXAM, ANNUAL: ICD-10-CM

## 2025-05-21 DIAGNOSIS — J30.9 ALLERGIC RHINITIS, UNSPECIFIED SEASONALITY, UNSPECIFIED TRIGGER: ICD-10-CM

## 2025-05-21 DIAGNOSIS — E66.3 OVERWEIGHT (BMI 25.0-29.9): ICD-10-CM

## 2025-05-21 DIAGNOSIS — M17.11 PRIMARY OSTEOARTHRITIS OF RIGHT KNEE: ICD-10-CM

## 2025-05-21 DIAGNOSIS — Z00.00 PHYSICAL EXAM, ANNUAL: Primary | ICD-10-CM

## 2025-05-21 LAB
ALBUMIN SERPL-MCNC: 4.9 G/DL (ref 3.2–4.8)
ALBUMIN/GLOB SERPL: 1.8 {RATIO} (ref 1–2)
ALP LIVER SERPL-CCNC: 99 U/L (ref 41–108)
ALT SERPL-CCNC: 11 U/L (ref 10–49)
ANION GAP SERPL CALC-SCNC: 11 MMOL/L (ref 0–18)
AST SERPL-CCNC: 15 U/L (ref ?–34)
BASOPHILS # BLD AUTO: 0.09 X10(3) UL (ref 0–0.2)
BASOPHILS NFR BLD AUTO: 0.9 %
BILIRUB SERPL-MCNC: 1.1 MG/DL (ref 0.3–1.2)
BUN BLD-MCNC: 13 MG/DL (ref 9–23)
BUN/CREAT SERPL: 14.3 (ref 10–20)
CALCIUM BLD-MCNC: 9.5 MG/DL (ref 8.7–10.4)
CHLORIDE SERPL-SCNC: 102 MMOL/L (ref 98–112)
CHOLEST SERPL-MCNC: 137 MG/DL (ref ?–200)
CO2 SERPL-SCNC: 26 MMOL/L (ref 21–32)
CREAT BLD-MCNC: 0.91 MG/DL (ref 0.55–1.02)
DEPRECATED RDW RBC AUTO: 38.3 FL (ref 35.1–46.3)
EGFRCR SERPLBLD CKD-EPI 2021: 75 ML/MIN/1.73M2 (ref 60–?)
EOSINOPHIL # BLD AUTO: 0.17 X10(3) UL (ref 0–0.7)
EOSINOPHIL NFR BLD AUTO: 1.7 %
ERYTHROCYTE [DISTWIDTH] IN BLOOD BY AUTOMATED COUNT: 12.4 % (ref 11–15)
FASTING PATIENT LIPID ANSWER: YES
FASTING STATUS PATIENT QL REPORTED: YES
GLOBULIN PLAS-MCNC: 2.7 G/DL (ref 2–3.5)
GLUCOSE BLD-MCNC: 92 MG/DL (ref 70–99)
HCT VFR BLD AUTO: 43.7 % (ref 35–48)
HDLC SERPL-MCNC: 69 MG/DL (ref 40–59)
HGB BLD-MCNC: 14.5 G/DL (ref 12–16)
IMM GRANULOCYTES # BLD AUTO: 0.02 X10(3) UL (ref 0–1)
IMM GRANULOCYTES NFR BLD: 0.2 %
LDLC SERPL CALC-MCNC: 52 MG/DL (ref ?–100)
LYMPHOCYTES # BLD AUTO: 3.53 X10(3) UL (ref 1–4)
LYMPHOCYTES NFR BLD AUTO: 35 %
MCH RBC QN AUTO: 28.3 PG (ref 26–34)
MCHC RBC AUTO-ENTMCNC: 33.2 G/DL (ref 31–37)
MCV RBC AUTO: 85.4 FL (ref 80–100)
MONOCYTES # BLD AUTO: 0.85 X10(3) UL (ref 0.1–1)
MONOCYTES NFR BLD AUTO: 8.4 %
NEUTROPHILS # BLD AUTO: 5.43 X10 (3) UL (ref 1.5–7.7)
NEUTROPHILS # BLD AUTO: 5.43 X10(3) UL (ref 1.5–7.7)
NEUTROPHILS NFR BLD AUTO: 53.8 %
NONHDLC SERPL-MCNC: 68 MG/DL (ref ?–130)
OSMOLALITY SERPL CALC.SUM OF ELEC: 288 MOSM/KG (ref 275–295)
PLATELET # BLD AUTO: 402 10(3)UL (ref 150–450)
POTASSIUM SERPL-SCNC: 4.7 MMOL/L (ref 3.5–5.1)
PROT SERPL-MCNC: 7.6 G/DL (ref 5.7–8.2)
RBC # BLD AUTO: 5.12 X10(6)UL (ref 3.8–5.3)
SODIUM SERPL-SCNC: 139 MMOL/L (ref 136–145)
TRIGL SERPL-MCNC: 84 MG/DL (ref 30–149)
TSI SER-ACNC: 1.16 UIU/ML (ref 0.55–4.78)
VLDLC SERPL CALC-MCNC: 12 MG/DL (ref 0–30)
WBC # BLD AUTO: 10.1 X10(3) UL (ref 4–11)

## 2025-05-21 PROCEDURE — 36415 COLL VENOUS BLD VENIPUNCTURE: CPT

## 2025-05-21 PROCEDURE — 80061 LIPID PANEL: CPT

## 2025-05-21 PROCEDURE — 85025 COMPLETE CBC W/AUTO DIFF WBC: CPT

## 2025-05-21 PROCEDURE — 80053 COMPREHEN METABOLIC PANEL: CPT

## 2025-05-21 PROCEDURE — 84443 ASSAY THYROID STIM HORMONE: CPT

## 2025-05-21 RX ORDER — PHENTERMINE HYDROCHLORIDE 15 MG/1
15 CAPSULE ORAL EVERY MORNING
Qty: 30 CAPSULE | Refills: 5 | Status: SHIPPED | OUTPATIENT
Start: 2025-06-09

## 2025-05-21 NOTE — TELEPHONE ENCOUNTER
Faxed RE Cologuard and Facesheet faxed to . Conf Fax Sheet Received on 5/21/25. Sent copy to scan and placed copy on Wednesday hold folder.

## 2025-05-21 NOTE — PROGRESS NOTES
Yoli Callejas is a 54 year old female.    HPI:     Chief Complaint   Patient presents with    CPX       55 y/o F here for physical exam; has lost 21 pounds on phentermine 15 mg po every day; no insomnia;  no CP; no SOB; no headaches; no palpitation; intermittent right knee pain with osteoarthritis of right knee; XR in Feb 2022 shows evidence of previous ACL   reconstruction of the right knee.  There is moderate narrowing of the   medial, lateral, patellofemoral compartment; sees orthopedics, Dr Romero      HISTORY:  Past Medical History[1]   Past Surgical History[2]   Family History[3]   Social History: Short Social Hx on File[4]     Medications (Active prior to today's visit):  Current Medications[5]    Allergies:  Allergies[6]            ROS:   Constitutional: no weight loss; no fatigue  ENMT:  Negative for ear drainage, hearing loss and nasal drainage  Eyes:  Negative for eye discharge and vision loss  Cardiovascular:  Negative for chest pain; negative palpitations  Respiratory:  Negative for cough, dyspnea and wheezing  Endocrine:  Negative for abnormal sleep patterns, increased activity, polydipsia and polyphagia  Gastrointestinal:  Negative for abdominal pain, constipation, decreased appetite, diarrhea and vomiting; no melena or hematochezia  Musculoskeletal:  Negative for arthralgias or myalgias  Genitourinary:  Negative for dysuria or polyuria  Hema/Lymph:  Negative for easy bleeding and easy bruising  Integumentary:  Negative for pruritus and rash  Neurological:  Negative for gait disturbance; negative for paresthesias   All other review of systems are negative.        PHYSICAL EXAM:   Blood pressure 157/89, pulse 72, temperature 97.9 °F (36.6 °C), temperature source Oral, resp. rate 16, height 5' 7\" (1.702 m), weight 144 lb (65.3 kg), SpO2 98%.  Constitutional: alert and oriented x3 in no acute distress  HEENT- EOMI, PERRL  Nose/Mouth/Throat: pharynx without erythema; no oral lesions  Neck/Thyroid: neck  supple; no thyromegaly  Lymphatics: no lymphadenopathy of neck or groin  Cardiovascular: RRR, S1, S2, no S3 or murmur  Respiratory: lungs without crackles or wheezes  Abdomen: normoactive bowel sounds, soft, non-tender and non-distended  Extremities: no clubbing, cyanosis or edema  Vascular: no carotid bruits; DP/PT 2/2  Musculoskeletal: Motor 5/5 upper and lower extremities  Neurological: cranial nerves II-XII intact; light touch and proprioception intact  Skin: no rash or ulcerations         ASSESSMENT/PLAN:   Physical exam  Sees gynecologist Dr Rodríguez; mammo in Aug 2024 was BiRads 2; order per Dr Rodríguez; had negative Cologuard in June 2022; next due in 3 years or June 2025--> re-ordered Cologuard; check CBC, CMP, TSH, Lipids    History of Diarrhea  In April 2024; Sxs x 10d; presumed infectious; leaving for [place] in 3 d  -s/p Cipro 500 mg po BID #14     Overweight  Body mass index is 22.55 kg/m².; has lost 21 pounds   -on phentermine 15 mg po every day #30, 3 RF     Osteoarhritis of right knee  XR in Feb 2022 shows evidence of previous ACL   reconstruction of the right knee.  There is moderate narrowing of the   medial, lateral, patellofemoral compartment.  -sees orthopedics, Dr Romero     Allergic rhinitis  On Zyrtec 10 mg po qD            Spent 30 minutes obtaining history, evaluating patient, discussing treatment options, diet, exercise, review of available labs and radiology reports, and completing documentation.                Orders This Visit:  Orders Placed This Encounter   Procedures    CBC With Differential With Platelet    Comp Metabolic Panel (14)    TSH W Reflex To Free T4    Lipid Panel       Meds This Visit:  Requested Prescriptions     Signed Prescriptions Disp Refills    Phentermine HCl 15 MG Oral Cap 30 capsule 5     Sig: Take 1 capsule (15 mg total) by mouth every morning.       Imaging & Referrals:  None     5/21/2025  Fito Hi MD               [1]   Past Medical History:   Allergic  rhinitis    Depression    Environmental allergies    Pneumothorax    S/P ACL repair    on left knee in June 2017; by Ortho Dr Colon    S/P ACL repair    of right knee in 2010   [2]   Past Surgical History:  Procedure Laterality Date    Appendectomy      Knee surgery Left 06/02/2017    ACL, Dr Colon    Other surgical history      Right ACL repair    Other surgical history  06/2017    Left ACL repair   [3]   Family History  Problem Relation Age of Onset    Hypertension Father     Heart Disorder Father 50    Diabetes Father         type 2    Lung Disorder Father         Emphysema    Heart Disease Father     Cancer Mother         pancreatic    Breast Cancer Maternal Grandmother     Cancer Brother         testicular   [4]   Social History  Socioeconomic History    Marital status:    Tobacco Use    Smoking status: Never    Smokeless tobacco: Never   Vaping Use    Vaping status: Never Used   Substance and Sexual Activity    Alcohol use: Yes     Comment: 2 drinks a month    Drug use: No   Other Topics Concern    Caffeine Concern Yes     Comment: Coffee 3 cups daily   [5]   Current Outpatient Medications   Medication Sig Dispense Refill    [START ON 6/9/2025] Phentermine HCl 15 MG Oral Cap Take 1 capsule (15 mg total) by mouth every morning. 30 capsule 5    Phentermine HCl 15 MG Oral Cap Take 1 capsule (15 mg total) by mouth every morning. 30 capsule 1    cetirizine (ZYRTEC) 10 MG Oral Tab Take 1 tablet (10 mg total) by mouth daily. For allergies     [6] No Known Allergies

## 2025-05-30 ENCOUNTER — PATIENT MESSAGE (OUTPATIENT)
Dept: INTERNAL MEDICINE CLINIC | Facility: CLINIC | Age: 55
End: 2025-05-30

## 2025-08-15 ENCOUNTER — TELEPHONE (OUTPATIENT)
Dept: FAMILY MEDICINE CLINIC | Facility: CLINIC | Age: 55
End: 2025-08-15

## 2025-08-25 ENCOUNTER — OFFICE VISIT (OUTPATIENT)
Dept: FAMILY MEDICINE CLINIC | Facility: CLINIC | Age: 55
End: 2025-08-25

## 2025-08-25 VITALS
DIASTOLIC BLOOD PRESSURE: 85 MMHG | WEIGHT: 147.38 LBS | OXYGEN SATURATION: 100 % | HEIGHT: 67 IN | RESPIRATION RATE: 16 BRPM | SYSTOLIC BLOOD PRESSURE: 131 MMHG | BODY MASS INDEX: 23.13 KG/M2 | HEART RATE: 83 BPM

## 2025-08-25 DIAGNOSIS — Z00.00 ROUTINE MEDICAL EXAM: ICD-10-CM

## 2025-08-25 DIAGNOSIS — R19.5 POSITIVE COLORECTAL CANCER SCREENING USING COLOGUARD TEST: Primary | ICD-10-CM

## 2025-08-25 DIAGNOSIS — M25.50 ARTHRALGIA, UNSPECIFIED JOINT: ICD-10-CM

## 2025-08-25 DIAGNOSIS — Z82.49 FHX: CORONARY ARTERY DISEASE: ICD-10-CM

## 2025-08-25 DIAGNOSIS — Z71.3 WEIGHT LOSS COUNSELING, ENCOUNTER FOR: ICD-10-CM

## 2025-08-25 DIAGNOSIS — Z13.6 ENCOUNTER FOR SCREENING FOR CORONARY ARTERY DISEASE: ICD-10-CM

## 2025-08-25 DIAGNOSIS — Z79.899 LONG-TERM USE OF HIGH-RISK MEDICATION: ICD-10-CM

## 2025-08-25 DIAGNOSIS — G47.09 OTHER INSOMNIA: ICD-10-CM

## 2025-08-25 RX ORDER — GABAPENTIN 100 MG/1
100 CAPSULE ORAL 2 TIMES DAILY
COMMUNITY

## 2025-08-26 ENCOUNTER — EKG ENCOUNTER (OUTPATIENT)
Dept: LAB | Age: 55
End: 2025-08-26
Attending: FAMILY MEDICINE

## 2025-08-26 DIAGNOSIS — Z00.00 ROUTINE MEDICAL EXAM: ICD-10-CM

## 2025-08-26 DIAGNOSIS — Z13.6 ENCOUNTER FOR SCREENING FOR CORONARY ARTERY DISEASE: ICD-10-CM

## 2025-08-26 DIAGNOSIS — Z79.899 LONG-TERM USE OF HIGH-RISK MEDICATION: ICD-10-CM

## 2025-08-26 LAB
ATRIAL RATE: 68 BPM
P AXIS: 68 DEGREES
P-R INTERVAL: 154 MS
Q-T INTERVAL: 366 MS
QRS DURATION: 72 MS
QTC CALCULATION (BEZET): 389 MS
R AXIS: 66 DEGREES
T AXIS: 63 DEGREES
VENTRICULAR RATE: 68 BPM

## 2025-08-26 PROCEDURE — 93005 ELECTROCARDIOGRAM TRACING: CPT

## 2025-08-26 PROCEDURE — 93010 ELECTROCARDIOGRAM REPORT: CPT | Performed by: STUDENT IN AN ORGANIZED HEALTH CARE EDUCATION/TRAINING PROGRAM

## 2025-08-28 ENCOUNTER — NURSE ONLY (OUTPATIENT)
Facility: CLINIC | Age: 55
End: 2025-08-28

## 2025-08-28 DIAGNOSIS — Z12.11 COLON CANCER SCREENING: Primary | ICD-10-CM
